# Patient Record
Sex: MALE | Race: WHITE
[De-identification: names, ages, dates, MRNs, and addresses within clinical notes are randomized per-mention and may not be internally consistent; named-entity substitution may affect disease eponyms.]

---

## 2017-03-24 ENCOUNTER — APPOINTMENT (OUTPATIENT)
Dept: HEART AND VASCULAR | Facility: CLINIC | Age: 52
End: 2017-03-24

## 2017-03-24 VITALS
BODY MASS INDEX: 38.11 KG/M2 | OXYGEN SATURATION: 98 % | HEART RATE: 68 BPM | DIASTOLIC BLOOD PRESSURE: 82 MMHG | TEMPERATURE: 98.6 F | SYSTOLIC BLOOD PRESSURE: 120 MMHG | WEIGHT: 281 LBS

## 2017-03-24 DIAGNOSIS — E66.9 OBESITY, UNSPECIFIED: ICD-10-CM

## 2017-03-24 DIAGNOSIS — C43.9 MALIGNANT MELANOMA OF SKIN, UNSPECIFIED: ICD-10-CM

## 2017-03-27 LAB
25(OH)D3 SERPL-MCNC: 14.2 NG/ML
ALBUMIN SERPL ELPH-MCNC: 4.7 G/DL
ALP BLD-CCNC: 68 U/L
ALT SERPL-CCNC: 26 U/L
ANION GAP SERPL CALC-SCNC: 15 MMOL/L
AST SERPL-CCNC: 19 U/L
BASOPHILS # BLD AUTO: 0.03 K/UL
BASOPHILS NFR BLD AUTO: 0.4 %
BILIRUB SERPL-MCNC: 0.7 MG/DL
BUN SERPL-MCNC: 14 MG/DL
CALCIUM SERPL-MCNC: 10 MG/DL
CHLORIDE SERPL-SCNC: 105 MMOL/L
CHOLEST SERPL-MCNC: 201 MG/DL
CHOLEST/HDLC SERPL: 4.4 RATIO
CO2 SERPL-SCNC: 23 MMOL/L
CREAT SERPL-MCNC: 1.05 MG/DL
EOSINOPHIL # BLD AUTO: 0.17 K/UL
EOSINOPHIL NFR BLD AUTO: 2.2 %
FOLATE SERPL-MCNC: 16.8 NG/ML
GLUCOSE SERPL-MCNC: 102 MG/DL
HBA1C MFR BLD HPLC: 5.4 %
HCT VFR BLD CALC: 45.4 %
HDLC SERPL-MCNC: 46 MG/DL
HGB BLD-MCNC: 14.8 G/DL
IMM GRANULOCYTES NFR BLD AUTO: 0.1 %
LDLC SERPL CALC-MCNC: NORMAL MG/DL
LYMPHOCYTES # BLD AUTO: 2.1 K/UL
LYMPHOCYTES NFR BLD AUTO: 27.3 %
MAN DIFF?: NORMAL
MCHC RBC-ENTMCNC: 26.8 PG
MCHC RBC-ENTMCNC: 32.6 GM/DL
MCV RBC AUTO: 82.1 FL
MONOCYTES # BLD AUTO: 0.66 K/UL
MONOCYTES NFR BLD AUTO: 8.6 %
NEUTROPHILS # BLD AUTO: 4.73 K/UL
NEUTROPHILS NFR BLD AUTO: 61.4 %
PLATELET # BLD AUTO: 260 K/UL
POTASSIUM SERPL-SCNC: 4.1 MMOL/L
PROT SERPL-MCNC: 7.2 G/DL
RBC # BLD: 5.53 M/UL
RBC # FLD: 14.8 %
SODIUM SERPL-SCNC: 143 MMOL/L
TRIGL SERPL-MCNC: 453 MG/DL
TSH SERPL-ACNC: 2.23 UIU/ML
VIT B12 SERPL-MCNC: 459 PG/ML
WBC # FLD AUTO: 7.7 K/UL

## 2017-09-05 ENCOUNTER — APPOINTMENT (OUTPATIENT)
Dept: HEART AND VASCULAR | Facility: CLINIC | Age: 52
End: 2017-09-05
Payer: COMMERCIAL

## 2017-09-05 VITALS
BODY MASS INDEX: 34.4 KG/M2 | WEIGHT: 254 LBS | HEIGHT: 72 IN | HEART RATE: 70 BPM | TEMPERATURE: 98.8 F | RESPIRATION RATE: 12 BRPM | OXYGEN SATURATION: 98 % | SYSTOLIC BLOOD PRESSURE: 122 MMHG | DIASTOLIC BLOOD PRESSURE: 78 MMHG

## 2017-09-05 DIAGNOSIS — Z01.818 ENCOUNTER FOR OTHER PREPROCEDURAL EXAMINATION: ICD-10-CM

## 2017-09-05 DIAGNOSIS — F41.9 ANXIETY DISORDER, UNSPECIFIED: ICD-10-CM

## 2017-09-05 DIAGNOSIS — Z87.448 PERSONAL HISTORY OF OTHER DISEASES OF URINARY SYSTEM: ICD-10-CM

## 2017-09-05 DIAGNOSIS — H26.9 UNSPECIFIED CATARACT: ICD-10-CM

## 2017-09-05 PROCEDURE — 93000 ELECTROCARDIOGRAM COMPLETE: CPT

## 2017-09-05 PROCEDURE — 36415 COLL VENOUS BLD VENIPUNCTURE: CPT

## 2017-09-05 PROCEDURE — 99214 OFFICE O/P EST MOD 30 MIN: CPT | Mod: 25

## 2017-09-06 LAB
25(OH)D3 SERPL-MCNC: 29.8 NG/ML
ALBUMIN SERPL ELPH-MCNC: 4.7 G/DL
ALP BLD-CCNC: 80 U/L
ALT SERPL-CCNC: 18 U/L
ANION GAP SERPL CALC-SCNC: 15 MMOL/L
AST SERPL-CCNC: 16 U/L
BASOPHILS # BLD AUTO: 0.03 K/UL
BASOPHILS NFR BLD AUTO: 0.4 %
BILIRUB SERPL-MCNC: 1 MG/DL
BUN SERPL-MCNC: 16 MG/DL
CALCIUM SERPL-MCNC: 10 MG/DL
CHLORIDE SERPL-SCNC: 101 MMOL/L
CO2 SERPL-SCNC: 25 MMOL/L
CREAT SERPL-MCNC: 1.09 MG/DL
EOSINOPHIL # BLD AUTO: 0.08 K/UL
EOSINOPHIL NFR BLD AUTO: 1.1 %
GLUCOSE SERPL-MCNC: 92 MG/DL
HBA1C MFR BLD HPLC: 5 %
HCT VFR BLD CALC: 45.7 %
HGB BLD-MCNC: 14.5 G/DL
IMM GRANULOCYTES NFR BLD AUTO: 0.3 %
LYMPHOCYTES # BLD AUTO: 2.08 K/UL
LYMPHOCYTES NFR BLD AUTO: 28.2 %
MAN DIFF?: NORMAL
MCHC RBC-ENTMCNC: 26.4 PG
MCHC RBC-ENTMCNC: 31.7 GM/DL
MCV RBC AUTO: 83.1 FL
MONOCYTES # BLD AUTO: 0.54 K/UL
MONOCYTES NFR BLD AUTO: 7.3 %
NEUTROPHILS # BLD AUTO: 4.62 K/UL
NEUTROPHILS NFR BLD AUTO: 62.7 %
PLATELET # BLD AUTO: 277 K/UL
POTASSIUM SERPL-SCNC: 4.5 MMOL/L
PROT SERPL-MCNC: 7.7 G/DL
PSA SERPL-MCNC: 0.77 NG/ML
RBC # BLD: 5.5 M/UL
RBC # FLD: 14.8 %
SODIUM SERPL-SCNC: 141 MMOL/L
TSH SERPL-ACNC: 2.18 UIU/ML
WBC # FLD AUTO: 7.37 K/UL

## 2018-03-05 ENCOUNTER — APPOINTMENT (OUTPATIENT)
Dept: HEART AND VASCULAR | Facility: CLINIC | Age: 53
End: 2018-03-05

## 2018-03-11 ENCOUNTER — RX RENEWAL (OUTPATIENT)
Age: 53
End: 2018-03-11

## 2018-03-12 ENCOUNTER — RX RENEWAL (OUTPATIENT)
Age: 53
End: 2018-03-12

## 2018-06-08 ENCOUNTER — RX RENEWAL (OUTPATIENT)
Age: 53
End: 2018-06-08

## 2018-07-07 ENCOUNTER — RX RENEWAL (OUTPATIENT)
Age: 53
End: 2018-07-07

## 2018-08-06 ENCOUNTER — RX RENEWAL (OUTPATIENT)
Age: 53
End: 2018-08-06

## 2018-09-04 ENCOUNTER — RX RENEWAL (OUTPATIENT)
Age: 53
End: 2018-09-04

## 2018-10-04 ENCOUNTER — RX RENEWAL (OUTPATIENT)
Age: 53
End: 2018-10-04

## 2018-10-30 ENCOUNTER — MEDICATION RENEWAL (OUTPATIENT)
Age: 53
End: 2018-10-30

## 2018-11-02 ENCOUNTER — MEDICATION RENEWAL (OUTPATIENT)
Age: 53
End: 2018-11-02

## 2018-11-29 ENCOUNTER — RX RENEWAL (OUTPATIENT)
Age: 53
End: 2018-11-29

## 2018-12-03 ENCOUNTER — APPOINTMENT (OUTPATIENT)
Dept: HEART AND VASCULAR | Facility: CLINIC | Age: 53
End: 2018-12-03
Payer: COMMERCIAL

## 2018-12-03 VITALS
BODY MASS INDEX: 36.71 KG/M2 | HEIGHT: 72 IN | OXYGEN SATURATION: 97 % | SYSTOLIC BLOOD PRESSURE: 128 MMHG | WEIGHT: 271.06 LBS | TEMPERATURE: 98.3 F | RESPIRATION RATE: 12 BRPM | DIASTOLIC BLOOD PRESSURE: 66 MMHG | HEART RATE: 58 BPM

## 2018-12-03 DIAGNOSIS — E78.5 HYPERLIPIDEMIA, UNSPECIFIED: ICD-10-CM

## 2018-12-03 DIAGNOSIS — I77.810 THORACIC AORTIC ECTASIA: ICD-10-CM

## 2018-12-03 PROCEDURE — 99214 OFFICE O/P EST MOD 30 MIN: CPT

## 2018-12-03 PROCEDURE — 93880 EXTRACRANIAL BILAT STUDY: CPT

## 2018-12-03 PROCEDURE — 93306 TTE W/DOPPLER COMPLETE: CPT

## 2018-12-03 PROCEDURE — 93000 ELECTROCARDIOGRAM COMPLETE: CPT

## 2018-12-03 NOTE — DISCUSSION/SUMMARY
[FreeTextEntry1] : At the time of the patient's visit a Carotid Doppler was performed to evaluate for PVD\par \par At the time of the patient's visit an Echocardiogram was performed to evaluate his LV function.\par \par  At the time of the visit the results were reviewed with patient \par \par We discussed meeting Dr Guillen of CT surgery in the future. He was given req for labs

## 2018-12-03 NOTE — PHYSICAL EXAM
[General Appearance - Well Developed] : well developed [Normal Appearance] : normal appearance [Well Groomed] : well groomed [General Appearance - Well Nourished] : well nourished [No Deformities] : no deformities [General Appearance - In No Acute Distress] : no acute distress [Normal Conjunctiva] : the conjunctiva exhibited no abnormalities [Normal Oral Mucosa] : normal oral mucosa [No Oral Pallor] : no oral pallor [No Oral Cyanosis] : no oral cyanosis [Respiration, Rhythm And Depth] : normal respiratory rhythm and effort [Exaggerated Use Of Accessory Muscles For Inspiration] : no accessory muscle use [Auscultation Breath Sounds / Voice Sounds] : lungs were clear to auscultation bilaterally [Heart Rate And Rhythm] : heart rate and rhythm were normal [Heart Sounds] : normal S1 and S2 [Bowel Sounds] : normal bowel sounds [Abdomen Soft] : soft [Abnormal Walk] : normal gait [Skin Color & Pigmentation] : normal skin color and pigmentation [Skin Turgor] : normal skin turgor [] : no rash [No Venous Stasis] : no venous stasis [Skin Lesions] : no skin lesions [No Skin Ulcers] : no skin ulcer [No Xanthoma] : no  xanthoma was observed [Oriented To Time, Place, And Person] : oriented to person, place, and time [Affect] : the affect was normal [Mood] : the mood was normal [No Anxiety] : not feeling anxious [FreeTextEntry1] : no edema

## 2018-12-03 NOTE — HISTORY OF PRESENT ILLNESS
[FreeTextEntry1] : 53 year male who had GI issues and had non-fasting labs with GI. We reviewed his results. He notes having nausea and stomach ache that Dr Webster is of GI in [place] is addressing

## 2018-12-19 ENCOUNTER — RX RENEWAL (OUTPATIENT)
Age: 53
End: 2018-12-19

## 2019-01-09 ENCOUNTER — MEDICATION RENEWAL (OUTPATIENT)
Age: 54
End: 2019-01-09

## 2019-01-16 ENCOUNTER — RX RENEWAL (OUTPATIENT)
Age: 54
End: 2019-01-16

## 2019-10-25 ENCOUNTER — RX RENEWAL (OUTPATIENT)
Age: 54
End: 2019-10-25

## 2019-12-27 ENCOUNTER — RX RENEWAL (OUTPATIENT)
Age: 54
End: 2019-12-27

## 2020-01-16 ENCOUNTER — RX RENEWAL (OUTPATIENT)
Age: 55
End: 2020-01-16

## 2020-02-09 ENCOUNTER — RX RENEWAL (OUTPATIENT)
Age: 55
End: 2020-02-09

## 2020-02-10 ENCOUNTER — RX RENEWAL (OUTPATIENT)
Age: 55
End: 2020-02-10

## 2020-05-11 ENCOUNTER — RX RENEWAL (OUTPATIENT)
Age: 55
End: 2020-05-11

## 2020-06-05 ENCOUNTER — RX RENEWAL (OUTPATIENT)
Age: 55
End: 2020-06-05

## 2020-08-19 ENCOUNTER — APPOINTMENT (OUTPATIENT)
Dept: HEART AND VASCULAR | Facility: CLINIC | Age: 55
End: 2020-08-19
Payer: COMMERCIAL

## 2020-08-19 DIAGNOSIS — R53.83 OTHER FATIGUE: ICD-10-CM

## 2020-08-19 DIAGNOSIS — U07.1 COVID-19: ICD-10-CM

## 2020-08-19 PROCEDURE — 99214 OFFICE O/P EST MOD 30 MIN: CPT | Mod: 95

## 2020-08-19 RX ORDER — BUPROPION HYDROCHLORIDE 150 MG/1
150 TABLET, EXTENDED RELEASE ORAL DAILY
Qty: 15 | Refills: 0 | Status: ACTIVE | COMMUNITY

## 2020-09-23 ENCOUNTER — NON-APPOINTMENT (OUTPATIENT)
Age: 55
End: 2020-09-23

## 2021-02-06 ENCOUNTER — RX RENEWAL (OUTPATIENT)
Age: 56
End: 2021-02-06

## 2021-08-07 ENCOUNTER — RX RENEWAL (OUTPATIENT)
Age: 56
End: 2021-08-07

## 2022-01-28 ENCOUNTER — RX RENEWAL (OUTPATIENT)
Age: 57
End: 2022-01-28

## 2022-02-22 ENCOUNTER — RX RENEWAL (OUTPATIENT)
Age: 57
End: 2022-02-22

## 2022-03-16 ENCOUNTER — RX RENEWAL (OUTPATIENT)
Age: 57
End: 2022-03-16

## 2022-04-13 ENCOUNTER — RX RENEWAL (OUTPATIENT)
Age: 57
End: 2022-04-13

## 2022-05-03 ENCOUNTER — RX RENEWAL (OUTPATIENT)
Age: 57
End: 2022-05-03

## 2022-05-04 ENCOUNTER — APPOINTMENT (OUTPATIENT)
Dept: HEART AND VASCULAR | Facility: CLINIC | Age: 57
End: 2022-05-04

## 2022-05-24 ENCOUNTER — APPOINTMENT (OUTPATIENT)
Dept: HEART AND VASCULAR | Facility: CLINIC | Age: 57
End: 2022-05-24

## 2022-06-01 ENCOUNTER — RX RENEWAL (OUTPATIENT)
Age: 57
End: 2022-06-01

## 2022-06-26 ENCOUNTER — RX RENEWAL (OUTPATIENT)
Age: 57
End: 2022-06-26

## 2022-06-27 ENCOUNTER — RX RENEWAL (OUTPATIENT)
Age: 57
End: 2022-06-27